# Patient Record
Sex: FEMALE | Race: WHITE | NOT HISPANIC OR LATINO | Employment: FULL TIME | URBAN - METROPOLITAN AREA
[De-identification: names, ages, dates, MRNs, and addresses within clinical notes are randomized per-mention and may not be internally consistent; named-entity substitution may affect disease eponyms.]

---

## 2018-05-31 ENCOUNTER — HOSPITAL ENCOUNTER (OUTPATIENT)
Dept: CARDIOLOGY | Facility: HOSPITAL | Age: 38
Discharge: HOME | End: 2018-05-31
Attending: NURSE PRACTITIONER | Admitting: NURSE PRACTITIONER

## 2019-06-03 ENCOUNTER — OFFICE VISIT (OUTPATIENT)
Dept: CARDIOLOGY | Facility: CLINIC | Age: 39
End: 2019-06-03

## 2019-06-03 VITALS
HEART RATE: 75 BPM | HEIGHT: 61 IN | DIASTOLIC BLOOD PRESSURE: 86 MMHG | SYSTOLIC BLOOD PRESSURE: 132 MMHG | WEIGHT: 137 LBS | BODY MASS INDEX: 25.86 KG/M2

## 2019-06-03 DIAGNOSIS — R00.2 PALPITATIONS: Primary | ICD-10-CM

## 2019-06-03 PROCEDURE — 93000 ELECTROCARDIOGRAM COMPLETE: CPT | Performed by: INTERNAL MEDICINE

## 2019-06-03 PROCEDURE — 99203 OFFICE O/P NEW LOW 30 MIN: CPT | Performed by: INTERNAL MEDICINE

## 2019-06-03 RX ORDER — ESCITALOPRAM OXALATE 10 MG/1
20 TABLET ORAL DAILY
COMMUNITY
Start: 2019-05-28 | End: 2022-08-09 | Stop reason: ALTCHOICE

## 2019-06-03 RX ORDER — HYDROXYZINE PAMOATE 25 MG/1
CAPSULE ORAL AS NEEDED
COMMUNITY
Start: 2019-05-28 | End: 2019-09-23

## 2019-06-03 NOTE — PROGRESS NOTES
Subjective:     Encounter Date:06/03/2019      Patient ID: Lexi Carr is a 38 y.o. female.    Chief Complaint:  This is a 38-year-old woman who presents for evaluation of tachycardia.  Over the last week she has had daily symptoms of tachycardia, diaphoresis, dyspnea, perioral/hand/feet paresthesias.  They occur at random.  They last between seconds and hours at a time.  Last week she presented to [facility] for evaluation of this.  By the time she arrived most of her symptoms have improved.  They diagnosed her with a panic attack/anxiety and she was started on Lexapro.  She is only been on Lexapro for about a week and has not noticed any change in her symptoms.  She is confused because she thought panic attack should not occur daily.  She exercises at the gym and has no exertional symptoms.  Her symptoms can occur both at rest and with moving around.  She has a family history of coronary disease/her father had bypass surgery.  She is a current pack per day smoker.  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  She drinks anywhere between 2 and 6 beers at a time on the weekends.          The following portions of the patient's history were reviewed and updated as appropriate: allergies, current medications, past family history, past medical history, past social history, past surgical history and problem list.    Past Medical History:   Diagnosis Date   • Chest discomfort    • Shortness of breath    • Weakness        History reviewed. No pertinent family history.    Social History     Socioeconomic History   • Marital status:      Spouse name: Not on file   • Number of children: Not on file   • Years of education: Not on file   • Highest education level: Not on file   Tobacco Use   • Smoking status: Current Every Day Smoker   • Smokeless tobacco: Never Used   • Tobacco comment: daily caffeine use   Substance and Sexual Activity   • Alcohol use: Yes     Frequency: Never      Comment: social   • Drug use: No       Allergies   Allergen Reactions   • Morphine Unknown (See Comments)     UNKNOWN       History reviewed. No pertinent surgical history.    Review of Systems   Constitution: Positive for diaphoresis and malaise/fatigue.   Cardiovascular: Positive for chest pain, dyspnea on exertion, irregular heartbeat, near-syncope and palpitations.   Respiratory: Positive for shortness of breath.          ECG 12 Lead  Date/Time: 6/3/2019 4:16 PM  Performed by: Zina Milner MD  Authorized by: Zina Milner MD   Previous ECG: no previous ECG available  Rhythm: sinus rhythm  Rate: normal  Conduction: conduction normal  ST Segments: ST segments normal  T Waves: T waves normal  QRS axis: normal  Other: no other findings    Clinical impression: normal ECG               Objective:     Physical Exam  GEN: no distress, alert and oriented, diaphoretic  Eyes: normal sclera, normal lids and lashes  HENT: moist mucus membranes,   Lungs CTAB, no rales or wheezes  Chest: no abnormalities  Neck: no JVD or carotid bruits  CV: RRR, no murmurs, , +2 DP and 2+ carotid pulses b/l  Abdo: soft,  Nontender, nondistended  Extr: no edema  Skin: no rash, warm, dry. Tan, sunburn,   Heme/Lymph: no bruising  Psych: organized thought, normal behavior and affect    Lab Review:         Assessment:          Diagnosis Plan   1. Palpitations  Holter Monitor - 48 Hour          Plan:         This is a 38-year-old woman who presents for evaluation of palpitations.  It does sound to me like she is having panic attacks.  She has palpitations which in turn Mendoza dyspnea, diaphoresis, perioral paresthesias, numbness and tingling in her hands and feet.  She does not have an overwhelming sense of doom, but she feels very unsettled.  I agree with the trial of Lexapro and explained the to the patient that it often takes weeks if not a full month before such medications will work.  I do think it is important to document what her  palpitations are considering the severity of her symptoms.  These events are happening every day so we will have her wear a 48-hour Holter monitor.  She had an echocardiogram in 2018 at [facility] which was apparently normal.  She is also had basic labs including thyroid studies last week which were also normal.    She needs to quit smoking.    Palma, thank you very much for referring this kind patient to me.  Please call with any questions or concerns.       Zina Milner MD  06/03/19

## 2019-09-06 ENCOUNTER — TELEPHONE (OUTPATIENT)
Dept: NEUROSURGERY | Facility: CLINIC | Age: 39
End: 2019-09-06

## 2019-09-11 NOTE — PROGRESS NOTES
"Subjective   History of Present Illness: Lexi Carr is a 39 y.o. female is being seen for consultation today at the request of [place] MD for 3 month history of R leg pain.  This began while she was on vacation back in June started out slowly and proximally and then extended throughout the whole right leg.  She has a weak sensation in the leg but she is not sure whether she simply guarding the leg trying to avoid moving it.  And not sit due to severe pain in the leg.  She denies any cause or injury.  She seen a Chiropractor who offered traction exercises and manipulation with no relief. She tried ice with no relief .  takes Advil 400 mg PRN for pain.  Denies bowel or bladder symptoms and denies numbness    Leg Pain    There was no injury mechanism. The pain is present in the right leg. The quality of the pain is described as shooting and stabbing. The pain is at a severity of 6/10. The pain is moderate. The pain has been constant since onset. Pertinent negatives include no numbness. Exacerbated by: sitting        The following portions of the patient's history were reviewed and updated as appropriate: allergies, current medications, past family history, past medical history, past social history, past surgical history and problem list.    Review of Systems   HENT: Negative.    Eyes: Negative.    Respiratory: Negative.    Cardiovascular: Negative.    Gastrointestinal: Negative.    Endocrine: Negative.    Genitourinary: Negative.    Musculoskeletal: Negative.         R leg pain   Skin: Negative.    Allergic/Immunologic: Negative.    Neurological: Positive for weakness (R leg ). Negative for numbness.   Hematological: Negative.    Psychiatric/Behavioral: Negative.    All other systems reviewed and are negative.      Objective     Vitals:    09/12/19 1003   BP: 137/80   Pulse: 64   Weight: 65.1 kg (143 lb 9.6 oz)   Height: 154.9 cm (61\")     Body mass index is 27.13 kg/m².      Physical " Exam  Neurologic Exam     Physical Exam:    CONSTITUTIONAL: This 39 year old right handed  female appears well developed, well-nourished and she is clearly uncomfortable moving about the room to try to find a comfortable standing position and she avoids sitting at all costs because of the severe right leg pain that develops when she sits.    HEAD & FACE: the head and face are symmetric, normocephalic and atraumatic.    EYES: Inspection of the conjunctivae and lids reveals no swelling, erythema or discharge.  Pupils are round, equal and reactive to light and there is no scleral icterus.    EARS, NOSE, MOUTH & THROAT: On inspection, the ears and nose are within normal limits.    NECK: the neck is supple and symmetric. The trachea is midline with no masses.    PULMONARY: Respiratory effort is normal with no increased work of breathing or signs of respiratory distress.    CARDIOVASCULAR: Pedal pulses are +2/4 bilaterally. Examination of the extremities shows no edema or varicosities.    LYMPHATIC: There is no palpable lymphadenopathy of the neck.    MUSCULOSKELETAL: Gait and station are within normal limits. The spine has normal alignment and range of motion,    SKIN: The skin is warm, dry and intact    NEUROLOGIC:   Cranial Nerves 2-12 intact  Normal motor strength noted. Muscle bulk and tone are normal.  Sensory exam is normal to all modalities.  Reflexes on the right side demonstrates 2/4 Triceps Reflex, 2/4 Biceps Reflex, 2/4 Brachioradialis Reflex, 2/4 Knee Jerk Reflex, 2/4 Ankle Jerk Reflex and no ankle clonus on the right.   Reflexes on the left side demonstrates 2/4 Triceps Reflex, 2/4 Biceps Reflex, 2/4 Brachioradialis Reflex, 2/4 Knee Jerk Reflex, 2/4 Ankle Jerk Reflex and no ankle clonus on the left.  Superficial/Primitive Reflexes: primitive reflexes were absent.  Ricky's Babinski and clonus are all negative  No coordination deficit observed.  Radicular testing showed a negative Waldemar (SAMRA)  test and positive straight leg raise on the right and cross positive on the left.  Cortical function is intact and without deficits. Speech is normal.    PSYCHIATRIC: oriented to person, place and time. Patient's mood and affect are normal.      Assessment/Plan   Independent Review of Radiographic Studies:      I personally reviewed the images from the following studies.    MRI of the lumbar spine done at [facility] MRI on August 9, 2019 reveals a right were disc extrusion L5-S1 contacting the right S1 nerve root.  There is degenerative change seen at L4-5 with some mild lateral recess narrowing no central stenosis.    Medical Decision Making:      She has a classic radiculopathy involving S1 on the right, although she has spared her right ankle reflex and sensation.  The intractable pain she can even sit in the exam room due to severe right leg pain over 3 months duration and failure of the therapeutic efforts offered by chiropractor she is now a candidate for surgical decompression.  I recommended a right L5-S1 lumbar microscopic discectomy.    A lumbar microscopic discectomy involves a small skin incision localized over the affected disc which is confirmed by X-ray. The natural space between lamina bones is widened with a drill and the nerve is gently retracted to expose the disc abnormality. The fragments of disc or bone spurs are removed to make more room around the nerve then the skin is closed with sutures    The patient understands that there are inherent risks to surgery including bleeding, infection, anesthetic risk, death, heart attack, stroke, damage to nerves, weakness, numbness, paralysis, failure to improve, need for further surgery, CSF leak, recurrence, persistent pain, and any other unforeseen complications. They comprehend and had opportunity to ask further questions.    Lexi was seen today for leg pain.    Diagnoses and all orders for this visit:    Herniated intervertebral disc of lumbar  spine  -     Case Request; Standing  -     ceFAZolin (ANCEF) 2 g in sodium chloride 0.9 % 100 mL IVPB  -     Case Request    Sciatica of right side  -     Case Request; Standing  -     ceFAZolin (ANCEF) 2 g in sodium chloride 0.9 % 100 mL IVPB  -     Case Request    Other orders  -     Follow anesthesia standing orders.  -     Obtain informed consent  -     Provide NPO Instructions to Patient; Future  -     Follow anesthesia standing orders.; Standing  -     Verify NPO Status; Standing  -     Obtain Informed Consent; Standing  -     SCD (sequential compression device)- to be placed on patient in Pre-op; Standing  -     Verify/Perform Chlorhexidine Skin Prep; Standing  -     Instructions on Coughing, Deep Breathing & Incentive Spirometry; Standing  -     Initiate Observation Status; Standing      Return for follow-up after surgery.           Omari Mims MD FACS FAANS  Neurological Surgery

## 2019-09-12 ENCOUNTER — OFFICE VISIT (OUTPATIENT)
Dept: NEUROSURGERY | Facility: CLINIC | Age: 39
End: 2019-09-12

## 2019-09-12 VITALS
HEART RATE: 64 BPM | DIASTOLIC BLOOD PRESSURE: 80 MMHG | SYSTOLIC BLOOD PRESSURE: 137 MMHG | BODY MASS INDEX: 27.11 KG/M2 | HEIGHT: 61 IN | WEIGHT: 143.6 LBS

## 2019-09-12 DIAGNOSIS — M51.26 HERNIATED INTERVERTEBRAL DISC OF LUMBAR SPINE: Primary | ICD-10-CM

## 2019-09-12 DIAGNOSIS — M54.31 SCIATICA OF RIGHT SIDE: ICD-10-CM

## 2019-09-12 PROBLEM — M54.30 SCIATICA: Status: ACTIVE | Noted: 2019-09-12

## 2019-09-12 PROCEDURE — 99244 OFF/OP CNSLTJ NEW/EST MOD 40: CPT | Performed by: NEUROLOGICAL SURGERY

## 2019-09-12 RX ORDER — TRAMADOL HYDROCHLORIDE 50 MG/1
TABLET ORAL
Refills: 0 | COMMUNITY
Start: 2019-08-21 | End: 2019-09-23

## 2019-09-12 RX ORDER — CEFAZOLIN SODIUM 2 G/100ML
2 INJECTION, SOLUTION INTRAVENOUS ONCE
Status: CANCELLED | OUTPATIENT
Start: 2019-09-30 | End: 2019-09-12

## 2019-09-23 ENCOUNTER — APPOINTMENT (OUTPATIENT)
Dept: PREADMISSION TESTING | Facility: HOSPITAL | Age: 39
End: 2019-09-23

## 2019-09-23 LAB
ANION GAP SERPL CALCULATED.3IONS-SCNC: 12 MMOL/L (ref 5–15)
BUN BLD-MCNC: 7 MG/DL (ref 6–20)
BUN/CREAT SERPL: 9.7 (ref 7–25)
CALCIUM SPEC-SCNC: 8.8 MG/DL (ref 8.6–10.5)
CHLORIDE SERPL-SCNC: 102 MMOL/L (ref 98–107)
CO2 SERPL-SCNC: 24 MMOL/L (ref 22–29)
CREAT BLD-MCNC: 0.72 MG/DL (ref 0.57–1)
DEPRECATED RDW RBC AUTO: 43.3 FL (ref 37–54)
ERYTHROCYTE [DISTWIDTH] IN BLOOD BY AUTOMATED COUNT: 12.6 % (ref 12.3–15.4)
GFR SERPL CREATININE-BSD FRML MDRD: 91 ML/MIN/1.73
GLUCOSE BLD-MCNC: 99 MG/DL (ref 65–99)
HCT VFR BLD AUTO: 40.2 % (ref 34–46.6)
HGB BLD-MCNC: 13.5 G/DL (ref 12–15.9)
MCH RBC QN AUTO: 31.6 PG (ref 26.6–33)
MCHC RBC AUTO-ENTMCNC: 33.6 G/DL (ref 31.5–35.7)
MCV RBC AUTO: 94.1 FL (ref 79–97)
PLATELET # BLD AUTO: 337 10*3/MM3 (ref 140–450)
PMV BLD AUTO: 10.3 FL (ref 6–12)
POTASSIUM BLD-SCNC: 4 MMOL/L (ref 3.5–5.2)
RBC # BLD AUTO: 4.27 10*6/MM3 (ref 3.77–5.28)
SODIUM BLD-SCNC: 138 MMOL/L (ref 136–145)
WBC NRBC COR # BLD: 9.76 10*3/MM3 (ref 3.4–10.8)

## 2019-09-23 PROCEDURE — 85027 COMPLETE CBC AUTOMATED: CPT | Performed by: NEUROLOGICAL SURGERY

## 2019-09-23 PROCEDURE — 36415 COLL VENOUS BLD VENIPUNCTURE: CPT

## 2019-09-23 PROCEDURE — 80048 BASIC METABOLIC PNL TOTAL CA: CPT | Performed by: NEUROLOGICAL SURGERY

## 2019-09-23 NOTE — DISCHARGE INSTRUCTIONS
Take the following medications the morning of surgery with a small sip of water:  NONE    PLEASE ARRIVE AT THE HOSPITAL AT 5:30 AM ON September 30, 2019    General Instructions:  • Do not eat solid food after midnight the night before surgery.  • You may drink clear liquids day of surgery but must stop at least one hour before your hospital arrival time.  • It is beneficial for you to have a clear drink that contains carbohydrates the day of surgery.  We suggest a 12 to 20 ounce bottle of Gatorade or Powerade for non-diabetic patients or a 12 to 20 ounce bottle of G2 or Powerade Zero for diabetic patients. (Pediatric patients, are not advised to drink a 12 to 20 ounce carbohydrate drink)    Clear liquids are liquids you can see through.  Nothing red in color.     Plain water                               Sports drinks  Sodas                                   Gelatin (Jell-O)  Fruit juices without pulp such as white grape juice and apple juice  Popsicles that contain no fruit or yogurt  Tea or coffee (no cream or milk added)  Gatorade / Powerade  G2 / Powerade Zero    • Infants may have breast milk up to four hours before surgery.  • Infants drinking formula may drink formula up to six hours before surgery.   • Patients who avoid smoking, chewing tobacco and alcohol for 4 weeks prior to surgery have a reduced risk of post-operative complications.  Quit smoking as many days before surgery as you can.  • Do not smoke, use chewing tobacco or drink alcohol the day of surgery.   • If applicable bring your C-PAP/ BI-PAP machine.  • Bring any papers given to you in the doctor’s office.  • Wear clean comfortable clothes and socks.  • Do not wear contact lenses, false eyelashes or make-up.  Bring a case for your glasses.   • Bring crutches or walker if applicable.  • Remove all piercings.  Leave jewelry and any other valuables at home.  • Hair extensions with metal clips must be removed prior to surgery.  • The Pre-Admission  Testing nurse will instruct you to bring medications if unable to obtain an accurate list in Pre-Admission Testing.      Preventing a Surgical Site Infection:  • For 2 to 3 days before surgery, avoid shaving with a razor because the razor can irritate skin and make it easier to develop an infection.    • Any areas of open skin can increase the risk of a post-operative wound infection by allowing bacteria to enter and travel throughout the body.  Notify your surgeon if you have any skin wounds / rashes even if it is not near the expected surgical site.  The area will need assessed to determine if surgery should be delayed until it is healed.  • The night prior to surgery sleep in a clean bed with clean clothing.  Do not allow pets to sleep with you.  • Shower on the morning of surgery using a fresh bar of anti-bacterial soap (such as Dial) and clean washcloth.  Dry with a clean towel and dress in clean clothing.  • Ask your surgeon if you will be receiving antibiotics prior to surgery.  • Make sure you, your family, and all healthcare providers clean their hands with soap and water or an alcohol based hand  before caring for you or your wound.    Day of surgery:  Upon arrival, a Pre-op nurse and Anesthesiologist will review your health history, obtain vital signs, and answer questions you may have.  The only belongings needed at this time will be a list of your home medications and if applicable your C-PAP/BI-PAP machine.  If you are staying overnight your family can leave the rest of your belongings in the car and bring them to your room later.  A Pre-op nurse will start an IV and you may receive medication in preparation for surgery, including something to help you relax.  Your family will be able to see you in the Pre-op area.  While you are in surgery your family should notify the waiting room  if they leave the waiting room area and provide a contact phone number.    Please be aware that  surgery does come with discomfort.  We want to make every effort to control your discomfort so please discuss any uncontrolled symptoms with your nurse.   Your doctor will most likely have prescribed pain medications.      If you are going home after surgery you will receive individualized written care instructions before being discharged.  A responsible adult must drive you to and from the hospital on the day of your surgery and stay with you for 24 hours.    If you are staying overnight following surgery, you will be transported to your hospital room following the recovery period.  [facility] has all private rooms.    You have received a list of surgical assistants for your reference.  If you have any questions please call Pre-Admission Testing at [phone].  Deductibles and co-payments are collected on the day of service. Please be prepared to pay the required co-pay, deductible or deposit on the day of service as defined by your plan.

## 2019-09-30 ENCOUNTER — HOSPITAL ENCOUNTER (OUTPATIENT)
Facility: HOSPITAL | Age: 39
Discharge: HOME | End: 2019-09-30
Attending: NEUROLOGICAL SURGERY | Admitting: NEUROLOGICAL SURGERY

## 2019-09-30 ENCOUNTER — ANESTHESIA EVENT (OUTPATIENT)
Dept: PERIOP | Facility: HOSPITAL | Age: 39
End: 2019-09-30

## 2019-09-30 ENCOUNTER — APPOINTMENT (OUTPATIENT)
Dept: GENERAL RADIOLOGY | Facility: HOSPITAL | Age: 39
End: 2019-09-30

## 2019-09-30 ENCOUNTER — ANESTHESIA (OUTPATIENT)
Dept: PERIOP | Facility: HOSPITAL | Age: 39
End: 2019-09-30

## 2019-09-30 VITALS
WEIGHT: 139.31 LBS | HEIGHT: 62 IN | HEART RATE: 70 BPM | SYSTOLIC BLOOD PRESSURE: 124 MMHG | BODY MASS INDEX: 25.64 KG/M2 | OXYGEN SATURATION: 97 % | RESPIRATION RATE: 16 BRPM | DIASTOLIC BLOOD PRESSURE: 88 MMHG | TEMPERATURE: 98.3 F

## 2019-09-30 DIAGNOSIS — M54.31 SCIATICA OF RIGHT SIDE: ICD-10-CM

## 2019-09-30 DIAGNOSIS — M51.26 HERNIATED INTERVERTEBRAL DISC OF LUMBAR SPINE: ICD-10-CM

## 2019-09-30 LAB
B-HCG UR QL: NEGATIVE
INTERNAL NEGATIVE CONTROL: NEGATIVE
INTERNAL POSITIVE CONTROL: POSITIVE
Lab: NORMAL

## 2019-09-30 PROCEDURE — 25010000002 DEXAMETHASONE PER 1 MG: Performed by: NURSE ANESTHETIST, CERTIFIED REGISTERED

## 2019-09-30 PROCEDURE — 72020 X-RAY EXAM OF SPINE 1 VIEW: CPT

## 2019-09-30 PROCEDURE — 81025 URINE PREGNANCY TEST: CPT | Performed by: ANESTHESIOLOGY

## 2019-09-30 PROCEDURE — 25010000002 KETOROLAC TROMETHAMINE PER 15 MG: Performed by: NURSE ANESTHETIST, CERTIFIED REGISTERED

## 2019-09-30 PROCEDURE — 25010000002 FENTANYL CITRATE (PF) 100 MCG/2ML SOLUTION

## 2019-09-30 PROCEDURE — 25010000002 NEOSTIGMINE PER 0.5 MG: Performed by: NURSE ANESTHETIST, CERTIFIED REGISTERED

## 2019-09-30 PROCEDURE — 25010000002 FENTANYL CITRATE (PF) 100 MCG/2ML SOLUTION: Performed by: NURSE ANESTHETIST, CERTIFIED REGISTERED

## 2019-09-30 PROCEDURE — 63030 LAMOT DCMPRN NRV RT 1 LMBR: CPT | Performed by: NEUROLOGICAL SURGERY

## 2019-09-30 PROCEDURE — 25010000003 CEFAZOLIN IN DEXTROSE 2-4 GM/100ML-% SOLUTION: Performed by: NEUROLOGICAL SURGERY

## 2019-09-30 PROCEDURE — 25010000002 PROPOFOL 10 MG/ML EMULSION: Performed by: NURSE ANESTHETIST, CERTIFIED REGISTERED

## 2019-09-30 PROCEDURE — 25010000002 FENTANYL CITRATE (PF) 100 MCG/2ML SOLUTION: Performed by: ANESTHESIOLOGY

## 2019-09-30 PROCEDURE — 25010000002 ONDANSETRON PER 1 MG: Performed by: NURSE ANESTHETIST, CERTIFIED REGISTERED

## 2019-09-30 PROCEDURE — G0378 HOSPITAL OBSERVATION PER HR: HCPCS

## 2019-09-30 PROCEDURE — 25010000002 MIDAZOLAM PER 1 MG: Performed by: ANESTHESIOLOGY

## 2019-09-30 PROCEDURE — 63030 LAMOT DCMPRN NRV RT 1 LMBR: CPT | Performed by: SPECIALIST/TECHNOLOGIST, OTHER

## 2019-09-30 RX ORDER — KETOROLAC TROMETHAMINE 30 MG/ML
15 INJECTION, SOLUTION INTRAMUSCULAR; INTRAVENOUS EVERY 6 HOURS PRN
Status: CANCELLED | OUTPATIENT
Start: 2019-09-30 | End: 2019-10-01

## 2019-09-30 RX ORDER — NALOXONE HCL 0.4 MG/ML
0.2 VIAL (ML) INJECTION AS NEEDED
Status: DISCONTINUED | OUTPATIENT
Start: 2019-09-30 | End: 2019-09-30 | Stop reason: HOSPADM

## 2019-09-30 RX ORDER — CYCLOBENZAPRINE HCL 10 MG
10 TABLET ORAL 3 TIMES DAILY PRN
Qty: 60 TABLET | Refills: 3 | Status: SHIPPED | OUTPATIENT
Start: 2019-09-30 | End: 2019-11-05

## 2019-09-30 RX ORDER — SODIUM CHLORIDE 0.9 % (FLUSH) 0.9 %
10 SYRINGE (ML) INJECTION AS NEEDED
Status: CANCELLED | OUTPATIENT
Start: 2019-09-30

## 2019-09-30 RX ORDER — LABETALOL HYDROCHLORIDE 5 MG/ML
5 INJECTION, SOLUTION INTRAVENOUS
Status: DISCONTINUED | OUTPATIENT
Start: 2019-09-30 | End: 2019-09-30 | Stop reason: HOSPADM

## 2019-09-30 RX ORDER — ACETAMINOPHEN 325 MG/1
650 TABLET ORAL ONCE AS NEEDED
Status: DISCONTINUED | OUTPATIENT
Start: 2019-09-30 | End: 2019-09-30 | Stop reason: HOSPADM

## 2019-09-30 RX ORDER — SODIUM CHLORIDE 0.9 % (FLUSH) 0.9 %
3-10 SYRINGE (ML) INJECTION AS NEEDED
Status: DISCONTINUED | OUTPATIENT
Start: 2019-09-30 | End: 2019-09-30 | Stop reason: HOSPADM

## 2019-09-30 RX ORDER — PROMETHAZINE HYDROCHLORIDE 25 MG/1
25 TABLET ORAL ONCE AS NEEDED
Status: DISCONTINUED | OUTPATIENT
Start: 2019-09-30 | End: 2019-09-30 | Stop reason: HOSPADM

## 2019-09-30 RX ORDER — DIPHENHYDRAMINE HYDROCHLORIDE 50 MG/ML
12.5 INJECTION INTRAMUSCULAR; INTRAVENOUS
Status: DISCONTINUED | OUTPATIENT
Start: 2019-09-30 | End: 2019-09-30 | Stop reason: HOSPADM

## 2019-09-30 RX ORDER — PROPOFOL 10 MG/ML
VIAL (ML) INTRAVENOUS AS NEEDED
Status: DISCONTINUED | OUTPATIENT
Start: 2019-09-30 | End: 2019-09-30 | Stop reason: SURG

## 2019-09-30 RX ORDER — FENTANYL CITRATE 50 UG/ML
INJECTION, SOLUTION INTRAMUSCULAR; INTRAVENOUS
Status: COMPLETED
Start: 2019-09-30 | End: 2019-09-30

## 2019-09-30 RX ORDER — MIDAZOLAM HYDROCHLORIDE 1 MG/ML
2 INJECTION INTRAMUSCULAR; INTRAVENOUS
Status: DISCONTINUED | OUTPATIENT
Start: 2019-09-30 | End: 2019-09-30 | Stop reason: HOSPADM

## 2019-09-30 RX ORDER — ONDANSETRON 2 MG/ML
4 INJECTION INTRAMUSCULAR; INTRAVENOUS EVERY 6 HOURS PRN
Status: CANCELLED | OUTPATIENT
Start: 2019-09-30

## 2019-09-30 RX ORDER — ONDANSETRON 2 MG/ML
4 INJECTION INTRAMUSCULAR; INTRAVENOUS ONCE AS NEEDED
Status: DISCONTINUED | OUTPATIENT
Start: 2019-09-30 | End: 2019-09-30 | Stop reason: HOSPADM

## 2019-09-30 RX ORDER — HYDRALAZINE HYDROCHLORIDE 20 MG/ML
5 INJECTION INTRAMUSCULAR; INTRAVENOUS
Status: DISCONTINUED | OUTPATIENT
Start: 2019-09-30 | End: 2019-09-30 | Stop reason: HOSPADM

## 2019-09-30 RX ORDER — SCOLOPAMINE TRANSDERMAL SYSTEM 1 MG/1
1 PATCH, EXTENDED RELEASE TRANSDERMAL ONCE
Status: DISCONTINUED | OUTPATIENT
Start: 2019-09-30 | End: 2019-09-30 | Stop reason: HOSPADM

## 2019-09-30 RX ORDER — ONDANSETRON 4 MG/1
4 TABLET, FILM COATED ORAL EVERY 6 HOURS PRN
Status: CANCELLED | OUTPATIENT
Start: 2019-09-30

## 2019-09-30 RX ORDER — PROMETHAZINE HYDROCHLORIDE 25 MG/ML
6.25 INJECTION, SOLUTION INTRAMUSCULAR; INTRAVENOUS
Status: DISCONTINUED | OUTPATIENT
Start: 2019-09-30 | End: 2019-09-30 | Stop reason: HOSPADM

## 2019-09-30 RX ORDER — EPHEDRINE SULFATE 50 MG/ML
5 INJECTION, SOLUTION INTRAVENOUS ONCE AS NEEDED
Status: DISCONTINUED | OUTPATIENT
Start: 2019-09-30 | End: 2019-09-30 | Stop reason: HOSPADM

## 2019-09-30 RX ORDER — SODIUM CHLORIDE 0.9 % (FLUSH) 0.9 %
3 SYRINGE (ML) INJECTION EVERY 12 HOURS SCHEDULED
Status: DISCONTINUED | OUTPATIENT
Start: 2019-09-30 | End: 2019-09-30 | Stop reason: HOSPADM

## 2019-09-30 RX ORDER — PROMETHAZINE HYDROCHLORIDE 25 MG/1
25 SUPPOSITORY RECTAL ONCE AS NEEDED
Status: DISCONTINUED | OUTPATIENT
Start: 2019-09-30 | End: 2019-09-30 | Stop reason: HOSPADM

## 2019-09-30 RX ORDER — LIDOCAINE HYDROCHLORIDE 40 MG/ML
SOLUTION TOPICAL AS NEEDED
Status: DISCONTINUED | OUTPATIENT
Start: 2019-09-30 | End: 2019-09-30 | Stop reason: SURG

## 2019-09-30 RX ORDER — FENTANYL CITRATE 50 UG/ML
50 INJECTION, SOLUTION INTRAMUSCULAR; INTRAVENOUS
Status: DISCONTINUED | OUTPATIENT
Start: 2019-09-30 | End: 2019-09-30 | Stop reason: HOSPADM

## 2019-09-30 RX ORDER — BUPIVACAINE HYDROCHLORIDE AND EPINEPHRINE 5; 5 MG/ML; UG/ML
INJECTION, SOLUTION PERINEURAL AS NEEDED
Status: DISCONTINUED | OUTPATIENT
Start: 2019-09-30 | End: 2019-09-30 | Stop reason: HOSPADM

## 2019-09-30 RX ORDER — CYCLOBENZAPRINE HCL 10 MG
10 TABLET ORAL 3 TIMES DAILY PRN
Status: CANCELLED | OUTPATIENT
Start: 2019-09-30

## 2019-09-30 RX ORDER — MIDAZOLAM HYDROCHLORIDE 1 MG/ML
1 INJECTION INTRAMUSCULAR; INTRAVENOUS
Status: DISCONTINUED | OUTPATIENT
Start: 2019-09-30 | End: 2019-09-30 | Stop reason: HOSPADM

## 2019-09-30 RX ORDER — HYDROCODONE BITARTRATE AND ACETAMINOPHEN 7.5; 325 MG/1; MG/1
1 TABLET ORAL ONCE AS NEEDED
Status: COMPLETED | OUTPATIENT
Start: 2019-09-30 | End: 2019-09-30

## 2019-09-30 RX ORDER — CEFAZOLIN SODIUM 2 G/100ML
2 INJECTION, SOLUTION INTRAVENOUS ONCE
Status: COMPLETED | OUTPATIENT
Start: 2019-09-30 | End: 2019-09-30

## 2019-09-30 RX ORDER — ROCURONIUM BROMIDE 10 MG/ML
INJECTION, SOLUTION INTRAVENOUS AS NEEDED
Status: DISCONTINUED | OUTPATIENT
Start: 2019-09-30 | End: 2019-09-30 | Stop reason: SURG

## 2019-09-30 RX ORDER — ACETAMINOPHEN 325 MG/1
650 TABLET ORAL EVERY 4 HOURS PRN
Status: CANCELLED | OUTPATIENT
Start: 2019-09-30

## 2019-09-30 RX ORDER — HYDROCODONE BITARTRATE AND ACETAMINOPHEN 7.5; 325 MG/1; MG/1
1 TABLET ORAL EVERY 6 HOURS PRN
Qty: 40 TABLET | Refills: 0 | Status: SHIPPED | OUTPATIENT
Start: 2019-09-30 | End: 2019-10-14 | Stop reason: HOSPADM

## 2019-09-30 RX ORDER — FAMOTIDINE 10 MG/ML
20 INJECTION, SOLUTION INTRAVENOUS ONCE
Status: COMPLETED | OUTPATIENT
Start: 2019-09-30 | End: 2019-09-30

## 2019-09-30 RX ORDER — GLYCOPYRROLATE 0.2 MG/ML
INJECTION INTRAMUSCULAR; INTRAVENOUS AS NEEDED
Status: DISCONTINUED | OUTPATIENT
Start: 2019-09-30 | End: 2019-09-30 | Stop reason: SURG

## 2019-09-30 RX ORDER — SENNA AND DOCUSATE SODIUM 50; 8.6 MG/1; MG/1
1 TABLET, FILM COATED ORAL NIGHTLY PRN
Status: CANCELLED | OUTPATIENT
Start: 2019-09-30

## 2019-09-30 RX ORDER — SODIUM CHLORIDE, SODIUM LACTATE, POTASSIUM CHLORIDE, CALCIUM CHLORIDE 600; 310; 30; 20 MG/100ML; MG/100ML; MG/100ML; MG/100ML
9 INJECTION, SOLUTION INTRAVENOUS CONTINUOUS
Status: DISCONTINUED | OUTPATIENT
Start: 2019-09-30 | End: 2019-09-30 | Stop reason: HOSPADM

## 2019-09-30 RX ORDER — OXYCODONE AND ACETAMINOPHEN 7.5; 325 MG/1; MG/1
1 TABLET ORAL ONCE AS NEEDED
Status: DISCONTINUED | OUTPATIENT
Start: 2019-09-30 | End: 2019-09-30 | Stop reason: HOSPADM

## 2019-09-30 RX ORDER — SODIUM CHLORIDE 0.9 % (FLUSH) 0.9 %
3 SYRINGE (ML) INJECTION EVERY 12 HOURS SCHEDULED
Status: CANCELLED | OUTPATIENT
Start: 2019-09-30

## 2019-09-30 RX ORDER — EPHEDRINE SULFATE 50 MG/ML
INJECTION, SOLUTION INTRAVENOUS AS NEEDED
Status: DISCONTINUED | OUTPATIENT
Start: 2019-09-30 | End: 2019-09-30 | Stop reason: SURG

## 2019-09-30 RX ORDER — FLUMAZENIL 0.1 MG/ML
0.2 INJECTION INTRAVENOUS AS NEEDED
Status: DISCONTINUED | OUTPATIENT
Start: 2019-09-30 | End: 2019-09-30 | Stop reason: HOSPADM

## 2019-09-30 RX ORDER — SODIUM CHLORIDE AND POTASSIUM CHLORIDE 150; 900 MG/100ML; MG/100ML
75 INJECTION, SOLUTION INTRAVENOUS CONTINUOUS
Status: CANCELLED | OUTPATIENT
Start: 2019-09-30

## 2019-09-30 RX ORDER — ONDANSETRON 2 MG/ML
INJECTION INTRAMUSCULAR; INTRAVENOUS AS NEEDED
Status: DISCONTINUED | OUTPATIENT
Start: 2019-09-30 | End: 2019-09-30 | Stop reason: SURG

## 2019-09-30 RX ORDER — DEXAMETHASONE SODIUM PHOSPHATE 10 MG/ML
INJECTION INTRAMUSCULAR; INTRAVENOUS AS NEEDED
Status: DISCONTINUED | OUTPATIENT
Start: 2019-09-30 | End: 2019-09-30 | Stop reason: SURG

## 2019-09-30 RX ORDER — LIDOCAINE HYDROCHLORIDE 10 MG/ML
0.5 INJECTION, SOLUTION EPIDURAL; INFILTRATION; INTRACAUDAL; PERINEURAL ONCE AS NEEDED
Status: DISCONTINUED | OUTPATIENT
Start: 2019-09-30 | End: 2019-09-30 | Stop reason: HOSPADM

## 2019-09-30 RX ORDER — PROMETHAZINE HYDROCHLORIDE 25 MG/ML
12.5 INJECTION, SOLUTION INTRAMUSCULAR; INTRAVENOUS ONCE AS NEEDED
Status: DISCONTINUED | OUTPATIENT
Start: 2019-09-30 | End: 2019-09-30 | Stop reason: HOSPADM

## 2019-09-30 RX ORDER — DIPHENHYDRAMINE HCL 25 MG
25 CAPSULE ORAL
Status: DISCONTINUED | OUTPATIENT
Start: 2019-09-30 | End: 2019-09-30 | Stop reason: HOSPADM

## 2019-09-30 RX ORDER — LIDOCAINE HYDROCHLORIDE 20 MG/ML
INJECTION, SOLUTION INFILTRATION; PERINEURAL AS NEEDED
Status: DISCONTINUED | OUTPATIENT
Start: 2019-09-30 | End: 2019-09-30 | Stop reason: SURG

## 2019-09-30 RX ORDER — HYDROMORPHONE HYDROCHLORIDE 1 MG/ML
0.5 INJECTION, SOLUTION INTRAMUSCULAR; INTRAVENOUS; SUBCUTANEOUS
Status: DISCONTINUED | OUTPATIENT
Start: 2019-09-30 | End: 2019-09-30 | Stop reason: HOSPADM

## 2019-09-30 RX ORDER — ZOLPIDEM TARTRATE 5 MG/1
5 TABLET ORAL NIGHTLY PRN
Status: CANCELLED | OUTPATIENT
Start: 2019-09-30 | End: 2019-10-10

## 2019-09-30 RX ORDER — KETOROLAC TROMETHAMINE 30 MG/ML
INJECTION, SOLUTION INTRAMUSCULAR; INTRAVENOUS AS NEEDED
Status: DISCONTINUED | OUTPATIENT
Start: 2019-09-30 | End: 2019-09-30 | Stop reason: SURG

## 2019-09-30 RX ADMIN — GLYCOPYRROLATE 0.5 MG: 0.2 INJECTION INTRAMUSCULAR; INTRAVENOUS at 08:44

## 2019-09-30 RX ADMIN — SODIUM CHLORIDE, POTASSIUM CHLORIDE, SODIUM LACTATE AND CALCIUM CHLORIDE 9 ML/HR: 600; 310; 30; 20 INJECTION, SOLUTION INTRAVENOUS at 06:20

## 2019-09-30 RX ADMIN — FENTANYL CITRATE 50 MCG: 50 INJECTION INTRAMUSCULAR; INTRAVENOUS at 09:04

## 2019-09-30 RX ADMIN — ROCURONIUM BROMIDE 50 MG: 10 INJECTION INTRAVENOUS at 07:35

## 2019-09-30 RX ADMIN — DEXAMETHASONE SODIUM PHOSPHATE 8 MG: 10 INJECTION INTRAMUSCULAR; INTRAVENOUS at 07:46

## 2019-09-30 RX ADMIN — FENTANYL CITRATE 100 MCG: 50 INJECTION INTRAMUSCULAR; INTRAVENOUS at 07:35

## 2019-09-30 RX ADMIN — FENTANYL CITRATE 50 MCG: 50 INJECTION INTRAMUSCULAR; INTRAVENOUS at 08:55

## 2019-09-30 RX ADMIN — KETOROLAC TROMETHAMINE 30 MG: 30 INJECTION, SOLUTION INTRAMUSCULAR; INTRAVENOUS at 08:40

## 2019-09-30 RX ADMIN — FENTANYL CITRATE 50 MCG: 50 INJECTION INTRAMUSCULAR; INTRAVENOUS at 09:00

## 2019-09-30 RX ADMIN — FENTANYL CITRATE 50 MCG: 50 INJECTION INTRAMUSCULAR; INTRAVENOUS at 09:31

## 2019-09-30 RX ADMIN — FENTANYL CITRATE 50 MCG: 50 INJECTION INTRAMUSCULAR; INTRAVENOUS at 09:42

## 2019-09-30 RX ADMIN — SCOPALAMINE 1 PATCH: 1 PATCH, EXTENDED RELEASE TRANSDERMAL at 07:14

## 2019-09-30 RX ADMIN — EPHEDRINE SULFATE 5 MG: 50 INJECTION INTRAMUSCULAR; INTRAVENOUS; SUBCUTANEOUS at 08:23

## 2019-09-30 RX ADMIN — LIDOCAINE HYDROCHLORIDE 1 EACH: 40 SOLUTION TOPICAL at 07:38

## 2019-09-30 RX ADMIN — LIDOCAINE HYDROCHLORIDE 60 MG: 20 INJECTION, SOLUTION INFILTRATION; PERINEURAL at 07:35

## 2019-09-30 RX ADMIN — CEFAZOLIN SODIUM 2 G: 2 INJECTION, SOLUTION INTRAVENOUS at 07:44

## 2019-09-30 RX ADMIN — HYDROCODONE BITARTRATE AND ACETAMINOPHEN 1 TABLET: 7.5; 325 TABLET ORAL at 09:58

## 2019-09-30 RX ADMIN — ONDANSETRON 4 MG: 2 INJECTION INTRAMUSCULAR; INTRAVENOUS at 07:35

## 2019-09-30 RX ADMIN — PROPOFOL 150 MG: 10 INJECTION, EMULSION INTRAVENOUS at 07:35

## 2019-09-30 RX ADMIN — NEOSTIGMINE METHYLSULFATE 2.5 MG: 1 INJECTION INTRAMUSCULAR; INTRAVENOUS; SUBCUTANEOUS at 08:44

## 2019-09-30 RX ADMIN — MIDAZOLAM 2 MG: 1 INJECTION INTRAMUSCULAR; INTRAVENOUS at 07:15

## 2019-09-30 RX ADMIN — FAMOTIDINE 20 MG: 10 INJECTION, SOLUTION INTRAVENOUS at 07:16

## 2019-09-30 NOTE — ANESTHESIA PROCEDURE NOTES
Airway  Urgency: elective    Date/Time: 9/30/2019 7:38 AM  Airway not difficult    General Information and Staff    Patient location during procedure: OR  Anesthesiologist: Jani Mccollum MD  CRNA: Dyana Balderas CRNA    Indications and Patient Condition  Indications for airway management: airway protection    Preoxygenated: yes  Mask difficulty assessment: 1 - vent by mask    Final Airway Details  Final airway type: endotracheal airway      Successful airway: ETT  Cuffed: yes   Successful intubation technique: direct laryngoscopy  Facilitating devices/methods: intubating stylet  Endotracheal tube insertion site: oral  Blade: Sg  Blade size: 3  ETT size (mm): 7.0  Cormack-Lehane Classification: grade IIa - partial view of glottis  Placement verified by: chest auscultation   Cuff volume (mL): 8  Measured from: lips  ETT/EBT  to lips (cm): 20  Number of attempts at approach: 1  Assessment: lips, teeth, and gum same as pre-op and atraumatic intubation

## 2019-09-30 NOTE — ANESTHESIA POSTPROCEDURE EVALUATION
"Patient: Lexi Carr    Procedure Summary     Date:  09/30/19 Room / Location:  [facility] OR  / [facility] MAIN OR    Anesthesia Start:  0727 Anesthesia Stop:  0904    Procedure:  Lumbar Microscopic Discectomy, right Lumbar 5 Sacral 1 (Right Spine Lumbar) Diagnosis:       Herniated intervertebral disc of lumbar spine      Sciatica of right side      (Herniated intervertebral disc of lumbar spine [M51.26])      (Sciatica of right side [M54.31])    Surgeon:  Omari Mims MD Provider:  Jani Mccollum MD    Anesthesia Type:  general ASA Status:  2          Anesthesia Type: general  Last vitals  BP   125/82 (09/30/19 1030)   Temp   36.8 °C (98.3 °F) (09/30/19 1015)   Pulse   72 (09/30/19 1030)   Resp   16 (09/30/19 1030)     SpO2   96 % (09/30/19 1030)     Post Anesthesia Care and Evaluation    Patient location during evaluation: PHASE II  Patient participation: complete - patient participated  Level of consciousness: awake  Pain management: adequate  Airway patency: patent  Anesthetic complications: No anesthetic complications    Cardiovascular status: acceptable  Respiratory status: acceptable  Hydration status: acceptable    Comments: /82   Pulse 72   Temp 36.8 °C (98.3 °F) (Oral)   Resp 16   Ht 157.5 cm (62\")   Wt 63.2 kg (139 lb 5 oz)   LMP 09/14/2019   SpO2 96%   BMI 25.48 kg/m²         "

## 2019-10-07 ENCOUNTER — TELEPHONE (OUTPATIENT)
Dept: NEUROSURGERY | Facility: CLINIC | Age: 39
End: 2019-10-07

## 2019-10-07 RX ORDER — METHYLPREDNISOLONE 4 MG/1
TABLET ORAL
Qty: 1 EACH | Refills: 0 | Status: SHIPPED | OUTPATIENT
Start: 2019-10-07 | End: 2019-10-14

## 2019-10-07 NOTE — TELEPHONE ENCOUNTER
Pt called stating that she had surgery on 9/30 and her right leg has been hurting since last Thursday and doesn't know if this is something that is to be expected and should she be concerned? Pt states that the pain is throbbing and sharp and radiates into her right hip, thigh and down the back of her leg. Pt states that she does not have back pain, just leg pain and that she has not taken any pain medication since Wednesday. Please call and advise.

## 2019-10-07 NOTE — TELEPHONE ENCOUNTER
Called patient.  Mostly proximal pain.  No swelling, numbness or weakness.  I sent medrol to pharmacy on file.

## 2019-10-07 NOTE — TELEPHONE ENCOUNTER
I called and spoke with patient. She has pain medication but does not fill like her back pain is bad enough to take it. She has been taking Flexeril 10 mg at bed time but has noticed no relief. It just makes her go to seep. She denies any bladder or bowel incontinence and N/T. She denies any incisional problems and fever. She reports that all of the pain she is having is radiating down into her right lower extremity.       She is 1 week out from having a  Laminotomy (Hemilaminectomy), with decompression of nerve root, including partial facetectomy, foraminotomy and excision of herniated intervertebral disc, L5-S1 on the Right on 09/30/19.

## 2019-10-09 NOTE — PROGRESS NOTES
Subjective   Patient ID: Lexi Carr is a 39 y.o. female is here today for follow-up.  She is 2 weeks out from a right L5-S1 lumbar discectomy by Dr. Mims 9/30/19. She completed MDP which offered a little relief. She still complains of R leg pain. She denies any incision problems. Mrs. Carr takes Cyclobenzaprine 10 mg prn for pain.     Leg Pain    The pain is present in the right leg. The pain is at a severity of 3/10. The pain is mild.       The following portions of the patient's history were reviewed and updated as appropriate: allergies, current medications, past family history, past medical history, past social history, past surgical history and problem list.    Review of Systems   Genitourinary: Negative for difficulty urinating.   Musculoskeletal:        R leg pain    All other systems reviewed and are negative.      Objective   Physical Exam   Neurological:   Incision ok     Neurologic Exam    Assessment/Plan   Independent Review of Radiographic Studies:      Medical Decision Making:    Ms. Carr is 2 weeks out from a right L5-S1 lumbar discectomy.  She has done very well but still has quite a lot of discomfort and cramping type pain in the right buttock and posterior leg.  The pain is similar in severity to her preoperative pain but different in quality.  It is now more of an intermittent cramping rather than a constant burning type pain.  She did try a Medrol Dosepak without relief.  I reassured her that such symptoms are not uncommon and encouraged her to give it more time.  She will begin physical therapy.  We did discuss her restrictions.  She will call sooner with any questions or concerns and otherwise follow-up in 2 weeks.  If the pain has not improved at all we could consider new imaging.  Lexi was seen today for post-op.    Diagnoses and all orders for this visit:    Surgery follow-up examination  -     Ambulatory Referral to Physical Therapy Evaluate and treat (2-3 times per week  for 4wks)      Return in about 2 weeks (around 10/28/2019).

## 2019-10-14 ENCOUNTER — OFFICE VISIT (OUTPATIENT)
Dept: NEUROSURGERY | Facility: CLINIC | Age: 39
End: 2019-10-14

## 2019-10-14 VITALS
HEART RATE: 82 BPM | SYSTOLIC BLOOD PRESSURE: 127 MMHG | WEIGHT: 139 LBS | DIASTOLIC BLOOD PRESSURE: 86 MMHG | BODY MASS INDEX: 25.58 KG/M2 | HEIGHT: 62 IN

## 2019-10-14 DIAGNOSIS — Z09 SURGERY FOLLOW-UP EXAMINATION: Primary | ICD-10-CM

## 2019-10-14 PROCEDURE — 99024 POSTOP FOLLOW-UP VISIT: CPT | Performed by: PHYSICIAN ASSISTANT

## 2019-11-05 ENCOUNTER — OFFICE VISIT (OUTPATIENT)
Dept: NEUROSURGERY | Facility: CLINIC | Age: 39
End: 2019-11-05

## 2019-11-05 VITALS
BODY MASS INDEX: 26.52 KG/M2 | HEART RATE: 68 BPM | DIASTOLIC BLOOD PRESSURE: 64 MMHG | WEIGHT: 145 LBS | SYSTOLIC BLOOD PRESSURE: 120 MMHG

## 2019-11-05 DIAGNOSIS — Z09 SURGERY FOLLOW-UP EXAMINATION: Primary | ICD-10-CM

## 2019-11-05 PROBLEM — M54.31 SCIATICA OF RIGHT SIDE: Status: RESOLVED | Noted: 2019-09-12 | Resolved: 2019-11-05

## 2019-11-05 PROBLEM — M54.30 SCIATICA: Status: RESOLVED | Noted: 2019-09-12 | Resolved: 2019-11-05

## 2019-11-05 PROBLEM — M51.26 HERNIATED INTERVERTEBRAL DISC OF LUMBAR SPINE: Status: RESOLVED | Noted: 2019-09-12 | Resolved: 2019-11-05

## 2019-11-05 PROCEDURE — 99024 POSTOP FOLLOW-UP VISIT: CPT | Performed by: PHYSICIAN ASSISTANT

## 2019-12-05 NOTE — PROGRESS NOTES
"Subjective   History of Present Illness: Lexi Carr is a 39 y.o. female is here today for follow-up.  She's 10 weeks out from a of R L5-S1 discectomy by Dr Mims on 9/30/19. She is doing well.  She has returned to work full time and tolerating it well.  She is not taking any pain medications.     History of Present Illness    The following portions of the patient's history were reviewed and updated as appropriate: allergies, current medications, past family history, past medical history, past social history, past surgical history and problem list.    Review of Systems   Genitourinary: Negative for difficulty urinating.   Musculoskeletal: Positive for back pain.   All other systems reviewed and are negative.      Objective     Vitals:    12/13/19 1438   BP: 136/90   Pulse: 85   Weight: 65.8 kg (145 lb)   Height: 157.5 cm (62\")     Body mass index is 26.52 kg/m².      Physical Exam   Constitutional: She is oriented to person, place, and time. She appears well-developed and well-nourished. No distress.   HENT:   Head: Normocephalic and atraumatic.   Eyes: Pupils are equal, round, and reactive to light.   Neck: Normal range of motion.   Pulmonary/Chest: Effort normal.   Musculoskeletal: She exhibits no edema.   Neurological: She is alert and oriented to person, place, and time. She has normal strength. Gait normal.   Reflex Scores:       Tricep reflexes are 3+ on the right side and 3+ on the left side.       Bicep reflexes are 2+ on the right side and 2+ on the left side.       Brachioradialis reflexes are 2+ on the right side and 2+ on the left side.       Patellar reflexes are 3+ on the right side and 3+ on the left side.       Achilles reflexes are 2+ on the right side and 2+ on the left side.  Skin: Skin is warm and dry.   Psychiatric: She has a normal mood and affect. Her speech is normal.     Neurologic Exam     Mental Status   Oriented to person, place, and time.   Speech: speech is normal   Level of " consciousness: alert    Cranial Nerves     CN III, IV, VI   Pupils are equal, round, and reactive to light.    Motor Exam   Muscle bulk: normal  Overall muscle tone: normal    Strength   Strength 5/5 throughout.     Sensory Exam   Light touch normal.     Gait, Coordination, and Reflexes     Gait  Gait: normal    Reflexes   Right brachioradialis: 2+  Left brachioradialis: 2+  Right biceps: 2+  Left biceps: 2+  Right triceps: 3+  Left triceps: 3+  Right patellar: 3+  Left patellar: 3+  Right achilles: 2+  Left achilles: 2+  Right Shah: present  Left Shah: present  Right ankle clonus: absent  Left ankle clonus: absent        Assessment/Plan   Independent Review of Radiographic Studies:      I personally reviewed the images from the following studies.    No recent imaging    Medical Decision Making:    She is doing well from a surgical perspective and has already been back to work with no setbacks.  Interestingly, in conversation she states that she is imbalanced especially when she is trying to walk quickly on a treadmill.  Further discussion she also complains of incoordination of the hands with fine motor movements.  She says she has had these symptoms for several years and had never given them much of thought.  She does have quicker than average reflexes and bilateral Shah's.  She says she was told in the past that she had cervical disc disease, but she cannot recall the details.  Due to her complaints and exam findings cervical MRI is warranted.  We will have her get cervical MRI and then bring her back to discuss the results.    Lexi was seen today for post-op.    Diagnoses and all orders for this visit:    Herniated intervertebral disc of lumbar spine    Sciatica of right side    Imbalance  -     MRI Cervical Spine Without Contrast; Future    Hyperreflexia  -     MRI Cervical Spine Without Contrast; Future      Return for After radiographic tests.

## 2019-12-13 ENCOUNTER — OFFICE VISIT (OUTPATIENT)
Dept: NEUROSURGERY | Facility: CLINIC | Age: 39
End: 2019-12-13

## 2019-12-13 VITALS
BODY MASS INDEX: 26.68 KG/M2 | HEIGHT: 62 IN | SYSTOLIC BLOOD PRESSURE: 136 MMHG | WEIGHT: 145 LBS | DIASTOLIC BLOOD PRESSURE: 90 MMHG | HEART RATE: 85 BPM

## 2019-12-13 DIAGNOSIS — R29.2 HYPERREFLEXIA: ICD-10-CM

## 2019-12-13 DIAGNOSIS — M51.26 HERNIATED INTERVERTEBRAL DISC OF LUMBAR SPINE: Primary | ICD-10-CM

## 2019-12-13 DIAGNOSIS — R26.89 IMBALANCE: ICD-10-CM

## 2019-12-13 DIAGNOSIS — M54.31 SCIATICA OF RIGHT SIDE: ICD-10-CM

## 2019-12-13 PROCEDURE — 99024 POSTOP FOLLOW-UP VISIT: CPT | Performed by: NURSE PRACTITIONER

## 2019-12-20 ENCOUNTER — HOSPITAL ENCOUNTER (OUTPATIENT)
Dept: MRI IMAGING | Facility: HOSPITAL | Age: 39
Discharge: HOME | End: 2019-12-20
Admitting: NURSE PRACTITIONER

## 2019-12-20 DIAGNOSIS — R26.89 IMBALANCE: ICD-10-CM

## 2019-12-20 DIAGNOSIS — R29.2 HYPERREFLEXIA: ICD-10-CM

## 2019-12-20 PROCEDURE — 72141 MRI NECK SPINE W/O DYE: CPT

## 2019-12-30 ENCOUNTER — TELEPHONE (OUTPATIENT)
Dept: NEUROSURGERY | Facility: CLINIC | Age: 39
End: 2019-12-30

## 2020-01-02 NOTE — TELEPHONE ENCOUNTER
Louann Melgar R, APRN  You 5 hours ago (1:01 PM)      Please let her know that she has arthritis in her cervical spine as she has been told before.  She does not have a bruise in her spinal cord which was my concern based on her reflexes.  This is something that may need to be followed intermittently if she develops any arm symptoms or worsening balance or coordination issues.    Routing comment      Patient was given the results from Louann above. She requested we cancel her appointment and she will call us if she has any of those symptoms.

## 2022-05-02 ENCOUNTER — OFFICE (OUTPATIENT)
Dept: URBAN - METROPOLITAN AREA CLINIC 64 | Facility: CLINIC | Age: 42
End: 2022-05-02

## 2022-05-02 VITALS
HEIGHT: 61 IN | DIASTOLIC BLOOD PRESSURE: 112 MMHG | WEIGHT: 154 LBS | SYSTOLIC BLOOD PRESSURE: 157 MMHG | HEART RATE: 83 BPM

## 2022-05-02 DIAGNOSIS — R14.0 ABDOMINAL DISTENSION (GASEOUS): ICD-10-CM

## 2022-05-02 DIAGNOSIS — R10.13 EPIGASTRIC PAIN: ICD-10-CM

## 2022-05-02 PROCEDURE — 99204 OFFICE O/P NEW MOD 45 MIN: CPT | Performed by: NURSE PRACTITIONER

## 2022-05-02 RX ORDER — ESOMEPRAZOLE MAGNESIUM 40 MG/1
40 CAPSULE, DELAYED RELEASE ORAL
Qty: 90 | Refills: 3 | Status: ACTIVE
Start: 2022-05-02

## 2022-06-14 ENCOUNTER — OFFICE (OUTPATIENT)
Dept: URBAN - METROPOLITAN AREA CLINIC 64 | Facility: CLINIC | Age: 42
End: 2022-06-14

## 2022-06-14 VITALS
HEART RATE: 76 BPM | HEIGHT: 61 IN | DIASTOLIC BLOOD PRESSURE: 115 MMHG | SYSTOLIC BLOOD PRESSURE: 170 MMHG | WEIGHT: 154 LBS

## 2022-06-14 DIAGNOSIS — R14.0 ABDOMINAL DISTENSION (GASEOUS): ICD-10-CM

## 2022-06-14 DIAGNOSIS — R10.13 EPIGASTRIC PAIN: ICD-10-CM

## 2022-06-14 PROCEDURE — 99214 OFFICE O/P EST MOD 30 MIN: CPT | Performed by: NURSE PRACTITIONER

## 2022-07-27 ENCOUNTER — ON CAMPUS - OUTPATIENT (OUTPATIENT)
Dept: RURAL HOSPITAL 3 | Facility: HOSPITAL | Age: 42
End: 2022-07-27
Payer: COMMERCIAL

## 2022-07-27 DIAGNOSIS — R19.4 CHANGE IN BOWEL HABIT: ICD-10-CM

## 2022-07-27 DIAGNOSIS — R10.13 EPIGASTRIC PAIN: ICD-10-CM

## 2022-07-27 DIAGNOSIS — D12.5 BENIGN NEOPLASM OF SIGMOID COLON: ICD-10-CM

## 2022-07-27 DIAGNOSIS — K29.50 UNSPECIFIED CHRONIC GASTRITIS WITHOUT BLEEDING: ICD-10-CM

## 2022-07-27 DIAGNOSIS — K20.80 OTHER ESOPHAGITIS WITHOUT BLEEDING: ICD-10-CM

## 2022-07-27 DIAGNOSIS — R13.10 DYSPHAGIA, UNSPECIFIED: ICD-10-CM

## 2022-07-27 DIAGNOSIS — K64.0 FIRST DEGREE HEMORRHOIDS: ICD-10-CM

## 2022-07-27 PROCEDURE — 43450 DILATE ESOPHAGUS 1/MULT PASS: CPT | Performed by: INTERNAL MEDICINE

## 2022-07-27 PROCEDURE — 43239 EGD BIOPSY SINGLE/MULTIPLE: CPT | Performed by: INTERNAL MEDICINE

## 2022-07-27 PROCEDURE — 45380 COLONOSCOPY AND BIOPSY: CPT | Performed by: INTERNAL MEDICINE

## 2022-08-08 ENCOUNTER — TELEPHONE (OUTPATIENT)
Dept: NEUROSURGERY | Facility: CLINIC | Age: 42
End: 2022-08-08

## 2022-08-08 NOTE — TELEPHONE ENCOUNTER
Provider: LANCE  Caller: PAUL  Relationship to Patient: SELF    Phone Number: [phone]  Reason for Call: WENT TO THE ED AND FOUND OUT SHE HAS A FRACTURE IN HER BACK   When was the patient last seen: 11/5/2019  SURGERY: 09/30/2019-Lumbar Microscopic Discectomy, right Lumbar 5 Sacral 1  When did it start: 08/06/2022  Where is it located: LOWER BACK       PT WENT TO THE ED TODAY DUE TO HER SLIPPING AND FALLING, THEY DID A CT SCAN THAT SHOWS A nondisplaced L1-L3 transverse process fractures. NOTE IN CARE EVERYWHERE    SHE WOULD LIKE TO SEE DR LUCAS AGAIN . PLEASE CALL PT AND ADVISE OF NEW APPT     THANK YOU

## 2022-08-09 ENCOUNTER — OFFICE VISIT (OUTPATIENT)
Dept: NEUROSURGERY | Facility: CLINIC | Age: 42
End: 2022-08-09

## 2022-08-09 VITALS
BODY MASS INDEX: 27.6 KG/M2 | WEIGHT: 150 LBS | SYSTOLIC BLOOD PRESSURE: 132 MMHG | HEART RATE: 89 BPM | OXYGEN SATURATION: 99 % | DIASTOLIC BLOOD PRESSURE: 88 MMHG | HEIGHT: 62 IN

## 2022-08-09 DIAGNOSIS — S32.009D CLOSED FRACTURE OF TRANSVERSE PROCESS OF LUMBAR VERTEBRA WITH ROUTINE HEALING: Primary | ICD-10-CM

## 2022-08-09 PROCEDURE — 99212 OFFICE O/P EST SF 10 MIN: CPT | Performed by: NURSE PRACTITIONER

## 2022-08-09 RX ORDER — VENLAFAXINE HYDROCHLORIDE 75 MG/1
CAPSULE, EXTENDED RELEASE ORAL DAILY
COMMUNITY
Start: 2022-04-08

## 2022-08-09 RX ORDER — CYCLOBENZAPRINE HCL 10 MG
10 TABLET ORAL
COMMUNITY
Start: 2022-08-08 | End: 2022-08-18

## 2022-08-09 NOTE — PROGRESS NOTES
"Subjective   Patient ID: Lexi Carr is a 41 y.o. female is here today for follow-up of lower back pain. She presented to the ED yesterday after a fall and CT shows nondisplaced L1-L3 transverse process fractures. She has a history of Lumbar Microscopic Discectomy, right Lumbar 5 Sacral 1 with Dr. Mims on 09/30/2019. She was last seen in the office 12/13/2019.    She is here today with her .    Today Ms. Carr reports she is having constant low back pain that started on Saturday after falling approximately 3-4 feet off a boat, landing in her right side. She reports of pain in her left buttock. She denies any leg pain. She denies any numbness or tingling. She denies any leg weakness. She denies any bowel or bladder incontinence. She reports the pain increases with sitting, bending or lying down. She reports she is taking flexeril 10mg and ibuprofen for pain.       History of Present Illness    The following portions of the patient's history were reviewed and updated as appropriate: allergies, current medications, past medical history, past social history, past surgical history and problem list.    Review of Systems   Constitutional: Positive for activity change.   Musculoskeletal: Positive for back pain.   Neurological: Negative for weakness and numbness.   Psychiatric/Behavioral: Positive for sleep disturbance.         Objective     Vitals:    08/09/22 0904   BP: 132/88   Pulse: 89   SpO2: 99%   Weight: 68 kg (150 lb)   Height: 157.5 cm (62\")     Body mass index is 27.44 kg/m².      Physical Exam  Vitals reviewed.   HENT:      Head: Normocephalic and atraumatic.      Nose: Nose normal.      Mouth/Throat:      Mouth: Mucous membranes are moist.   Eyes:      Extraocular Movements: Extraocular movements intact.   Pulmonary:      Effort: Pulmonary effort is normal.   Abdominal:      Palpations: Abdomen is soft.   Musculoskeletal:      Cervical back: Normal range of motion and neck supple.   Skin:     " General: Skin is warm and dry.   Neurological:      General: No focal deficit present.      Mental Status: She is alert and oriented to person, place, and time. Mental status is at baseline.      Deep Tendon Reflexes: Strength normal.   Psychiatric:         Speech: Speech normal.       Neurologic Exam     Mental Status   Oriented to person, place, and time.   Attention: normal. Concentration: normal.   Speech: speech is normal   Level of consciousness: alert  Knowledge: good.   Normal comprehension.     Cranial Nerves   Cranial nerves II through XII intact.     Motor Exam   Muscle bulk: normal  Overall muscle tone: normal    Strength   Strength 5/5 throughout.     Sensory Exam   Light touch normal.     Gait, Coordination, and Reflexes   Gait is antalgic 2/2 back pain           Assessment & Plan   Independent Review of Radiographic Studies:      I personally reviewed the images from the following studies:  CT CHEST/ABDOMEN/PELVIS 8/9/22-  Nondisplaced right L1 through L3 transverse process fractures.  No other evidence of significant trauma is identified.       Medical Decision Making:      Ms. Carr is here today for acute right-sided back pain after falling approximately 3-4 feet off a boat, landing in her right side. She went to the ED where CT chest, abdomen, and pelvis were done which showed nondisplaced L1-L3 transverse process fractures. Her neuro exam is negative other than ongoing right sided back pain that is increased with deep breathing, sitting, and bending. Advised patient that there is no surgical intervention warranted for these nondisplaced fractures and to continue alternating ibuprofen and Tylenol along with muscle relaxers. She asked about wearing a back brace but advised her that the brace would prohibit her from taking deep breaths which could potentially lead to the development of PNA. Advised her to alternate the use of heat and ice as well to help with pain and avoid any high impact  activities. Advised to call with new or worsening symptoms. She verbalized understanding and is agreement with the above plan.        Diagnoses and all orders for this visit:    1. Closed fracture of transverse process of lumbar vertebra with routine healing (Primary)      Return if symptoms worsen or fail to improve.

## 2022-08-23 ENCOUNTER — OFFICE (OUTPATIENT)
Dept: URBAN - METROPOLITAN AREA CLINIC 64 | Facility: CLINIC | Age: 42
End: 2022-08-23

## 2022-08-23 VITALS
HEART RATE: 93 BPM | WEIGHT: 150 LBS | DIASTOLIC BLOOD PRESSURE: 99 MMHG | HEIGHT: 61 IN | SYSTOLIC BLOOD PRESSURE: 136 MMHG

## 2022-08-23 DIAGNOSIS — K59.00 CONSTIPATION, UNSPECIFIED: ICD-10-CM

## 2022-08-23 DIAGNOSIS — R14.0 ABDOMINAL DISTENSION (GASEOUS): ICD-10-CM

## 2022-08-23 PROCEDURE — 99213 OFFICE O/P EST LOW 20 MIN: CPT

## 2023-05-21 NOTE — ANESTHESIA PREPROCEDURE EVALUATION
Anesthesia Evaluation     Patient summary reviewed and Nursing notes reviewed   history of anesthetic complications: PONV  NPO Solid Status: > 8 hours  NPO Liquid Status: > 2 hours           Airway   Mallampati: II  TM distance: >3 FB  Neck ROM: full  Dental - normal exam     Pulmonary - normal exam    breath sounds clear to auscultation  (+) a smoker (15 pack years) Current Smoked day of surgery,   Cardiovascular - negative cardio ROS and normal exam    Rhythm: regular  Rate: normal    (-) angina, orthopnea, PND, CHENG      Neuro/Psych  (+) numbness, psychiatric history Anxiety,     GI/Hepatic/Renal/Endo - negative ROS     Musculoskeletal     (+) back pain, chronic pain,   Abdominal    Substance History - negative use     OB/GYN negative ob/gyn ROS         Other - negative ROS                     Anesthesia Plan    ASA 2     general     intravenous induction   Anesthetic plan, all risks, benefits, and alternatives have been provided, discussed and informed consent has been obtained with: patient.      
- - -

## 2024-10-31 ENCOUNTER — TRANSCRIBE ORDERS (OUTPATIENT)
Dept: ADMINISTRATIVE | Facility: HOSPITAL | Age: 44
End: 2024-10-31

## 2024-10-31 DIAGNOSIS — Z13.6 ENCOUNTER FOR SCREENING FOR VASCULAR DISEASE: Primary | ICD-10-CM

## 2024-11-07 ENCOUNTER — HOSPITAL ENCOUNTER (OUTPATIENT)
Dept: CARDIOLOGY | Facility: HOSPITAL | Age: 44
Discharge: HOME | End: 2024-11-07
Admitting: NURSE PRACTITIONER

## 2024-11-07 DIAGNOSIS — Z13.6 ENCOUNTER FOR SCREENING FOR VASCULAR DISEASE: ICD-10-CM

## 2024-11-07 LAB
BH CV VAS SCREENING CAROTID CCA LEFT: 79.9 CM/SEC
BH CV VAS SCREENING CAROTID CCA RIGHT: 80.1 CM/SEC
BH CV VAS SCREENING CAROTID ICA LEFT: 67.2 CM/SEC
BH CV VAS SCREENING CAROTID ICA RIGHT: 61.5 CM/SEC
BH CV XLRA MEAS - PAD LEFT ABI PT: 1.23
BH CV XLRA MEAS - PAD LEFT ARM: 114 MMHG
BH CV XLRA MEAS - PAD LEFT LEG PT: 140 MMHG
BH CV XLRA MEAS - PAD RIGHT ABI PT: 1.25
BH CV XLRA MEAS - PAD RIGHT ARM: 110 MMHG
BH CV XLRA MEAS - PAD RIGHT LEG PT: 143 MMHG
BH CV XLRA MEAS - PROX AO DIAM: 2.03 CM
BH CV XLRA MEAS LEFT DIST CCA EDV: -33.4 CM/SEC
BH CV XLRA MEAS LEFT DIST CCA PSV: -79.9 CM/SEC
BH CV XLRA MEAS LEFT ICA/CCA RATIO: 0.8
BH CV XLRA MEAS LEFT PROX ICA EDV: -25 CM/SEC
BH CV XLRA MEAS LEFT PROX ICA PSV: -67.2 CM/SEC
BH CV XLRA MEAS RIGHT DIST CCA EDV: -29.2 CM/SEC
BH CV XLRA MEAS RIGHT DIST CCA PSV: -80.1 CM/SEC
BH CV XLRA MEAS RIGHT ICA/CCA RATIO: 0.8
BH CV XLRA MEAS RIGHT PROX ICA EDV: -23.7 CM/SEC
BH CV XLRA MEAS RIGHT PROX ICA PSV: -61.5 CM/SEC

## 2024-11-07 PROCEDURE — 93799 UNLISTED CV SVC/PROCEDURE: CPT

## 2025-03-31 NOTE — PROGRESS NOTES
New Patient Consultation    REFERRING PHYSICIAN:  Xi Hunter MD  [facility] [address]    PRIMARY CARE PHYSICIAN:  Carmen Rodriges APRN      HISTORY OF PRESENT ILLNESS    This is a 44 y.o. female who presents with a 2-year history of a palpable area in the left breast with negative imaging of the area in 2023, 2024, and 2025.  She has a history of a bilateral breast reduction followed by a subsequent bilateral breast lift and augmentation with implants (subpectoral, gel).    She first noticed a left breast mass about about 2 years ago.  In 2023 she had pain and felt a mass in the outer aspect of her left breast but had a normal mammogram.  She again had a mammogram in 2024 which was reported as normal.  She was still able to feel a mass that had increased in size and was painful.  She presented to her plastic surgeon Dr. Baljit Torres and the mass was palpated.  An attempt was made to drain her biopsy of the mass then but no tissue was recovered.  An MRI was recommended and ordered on 3/12/2025 but denied by insurance.    She then underwent a bilateral diagnostic mammogram and left breast ultrasound on 3/28/2025 which noted bilateral breast implants.  No suspicious mass, architectural distortion, or suspicious calcification was identified in either breast.  In the left breast on ultrasound no suspicious mass was noted in the region of interest in the left breast at 2:00 8 cm from the nipple.  Dense breast tissue was noted and no cyst was seen.  There were no discrete focal abnormalities.  The imaging was read as BI-RADS 1.    Today, she is doing well and can persistently feel this dense area in her upper outer quadrant of the left breast.  She has occasional throbbing pain there that sometimes radiates towards the axilla.  Denies any overlying skin changes or any other breast masses or densities.  She is considering having her breast implants removed due to concern for breast  implant related disease.    REPRODUCTIVE HISTORY:   . P: 1. AB: 0.  Age at menarche: 15  Age at first childbirth: 17  Lactation/How long: no  Age at menopause: pre-menopausal  Total years of oral contraceptive use: no, hx of depo shot for several years  Total years of hormone replacement therapy: 6-month history of implanted estrogen and testosterone pellets in the gluteal tissue in     PAST MEDICAL HISTORY:  Past Medical History:   Diagnosis Date    Anxiety     Back pain     Cholesterol blood reduced     Hypertension     Neuropathy     RIGHT LEG    PONV (postoperative nausea and vomiting)        PAST SURGICAL HISTORY:  Past Surgical History:   Procedure Laterality Date    BREAST AUGMENTATION Bilateral 2020    Brian Martinez    CHOLECYSTECTOMY      LUMBAR DISCECTOMY Right 2019    Procedure: Lumbar Microscopic Discectomy, right Lumbar 5 Sacral 1;  Surgeon: Omari Mims MD;  Location: [facility];  Service: Neurosurgery    REDUCTION MAMMAPLASTY  2020    TONSILLECTOMY         Family History:  Family History   Problem Relation Age of Onset    Cancer Maternal Grandmother         unknown type    Stomach cancer Paternal Grandmother     Malig Hyperthermia Neg Hx        She has a family history significant for a cancer in her maternal grandmother that was at her creatin carotid artery and stomach cancer in her paternal grandmother.  Denies any family history of breast cancer, colon cancer, ovarian cancer, prostate cancer, pancreatic cancer, melanoma.    She is not of Ashkenazi Jew descent.  She has never been genetically tested.    SOCIAL HISTORY:  Social History     Tobacco Use    Smoking status: Every Day     Current packs/day: 0.75     Average packs/day: 0.8 packs/day for 21.3 years (16.0 ttl pk-yrs)     Types: Cigarettes     Passive exposure: Current    Smokeless tobacco: Never    Tobacco comments:     daily caffeine use   Vaping Use    Vaping status: Never Used   Substance  "Use Topics    Alcohol use: Yes     Comment: social    Drug use: No       Patient works as an  at [employer].  She is  and lives her  [place].  Patient denies any significant alcohol use or any drug use.  She does smoke cigarettes.    Medications:   Outpatient Encounter Medications as of 4/2/2025   Medication Sig Dispense Refill    losartan (COZAAR) 25 MG tablet Take 1 tablet by mouth Daily.      [DISCONTINUED] PROGESTERONE MICRONIZED PO Take 100 mg by mouth Daily.      [DISCONTINUED] venlafaxine XR (EFFEXOR-XR) 75 MG 24 hr capsule Take  by mouth Daily.       No facility-administered encounter medications on file as of 4/2/2025.        Allergies:   Allergies   Allergen Reactions    Bactrim [Sulfamethoxazole-Trimethoprim] Hives and Itching    Morphine Hives, Itching and Rash        Review of systems: A 14 point review of systems was obtained and was negative    PHYSICAL EXAM     /88 (BP Location: Right arm, Patient Position: Sitting, Cuff Size: Adult)   Pulse 91   Temp 99.1 °F (37.3 °C) (Infrared)   Resp 16   Ht 157.5 cm (62\")   Wt 67.4 kg (148 lb 8 oz)   SpO2 98%   BMI 27.16 kg/m²     General appearance:alert, appears stated age, and cooperative  Cardiac: normal rate and regular rhythm, well perfused. No significant peripheral edema.  Respiratory: clear to auscultation bilaterally, equal bilateral chest rise with normal effort on room air  Breast Exam:  Bilateral breast exam performed seated and supine.  Bilateral breasts are symmetric and not ptotic.  Bilaterally there are no overlying skin changes, nipple changes, nipple inversion, or nipple discharge.  In the left breast in the upper outer quadrant there is a denser area of deep tissue just above the pectoralis muscle/implant that is palpable and mildly tender to deep palpation.  In the same location on the right breast there is a similar but less prominent ridge of denser tissue.  Scanning with the " ultrasound shows skin with more fatty replaced tissue superficially and then an area of more heterogeneous appearing dense glandular breast tissue over the pectoralis muscle and implant without any discrete lesion or mass.  Bilaterally there is no adenopathy in the axillary, supraclavicular, or cervical lymph node basins.     Patient exam or treatment required medical chaperone.  The sensitive parts of the examination were performed with chaperone present: Allyssa Puckett MA    IMAGING:  I personally reviewed the patient's mammograms from 2023, 2024, 2025 as well as her left breast ultrasound from 2025.  My impression is bilateral subpectoral gel implants without signs of complication and most of the dense glandular breast tissue in the left breast pushed towards the upper outer quadrant without a discrete mass, architectural distortion, calcification, or other abnormality.  This is redemonstrated on ultrasound as a deep layer of denser appearing breast tissue.  I personally reviewed the radiology imaging and the radiology department earlier this week with no other new findings.    MAMMOGRAM DIAGNOSTIC BILATERAL WITH TOMOGRAPHY, US BREAST LEFT    Date of Exam: 3/28/2025 14:49 EDT    Indication: LT Breast Lump/Pain. History of prior bilateral breast reduction. Subsequent bilateral breast lift and implant placement.    Comparison: 4/26/2024, 8/15/2023, 11/8/2022, 11/1/2021    Technique: Diagnostic images of bilateral breasts were obtained with the use of tomography. Additional diagnostic left breast ultrasound with grayscale and color imaging was performed. The mammographic images were interpreted with the assistance of a computer aided detection system.    Findings:  Breast Density: The breasts are heterogenously dense, which may obscure small masses.    Findings:  Mammogram:  Bilateral breast implants are noted. No new suspicious mass, architectural distortion, or suspicious calcification is identified in either  breast.    Ultrasound:  Targeted ultrasound of the left breast was performed.  No suspicious mass is noted within the region of interest within the left breast at the 2 o'clock position approximately 8 cm from the nipple. Dense breast tissue is noted. No cyst is observed. No discrete focal abnormalities are identified.    IMPRESSION:  Negative and stable bilateral mammogram. Negative diagnostic ultrasound of the left breast. Clinical follow-up and routine screening mammography are recommended.    BI-RADS ASSESSMENT: BI-RADS 1. Negative.      Note: It has been reported that there is approximately a 15% false negative in mammography. Therefore, management of a palpable abnormality should not be deferred because of a negative mammogram.    Electronically Signed: Vidal Kingston MD  3/28/2025 16:05 EDT  Workstation ID: [id number]        Assessment:  This is a 44 y.o. female with a persistent palpable density in the upper outer quadrant of the left breast that is painful and slightly mobile.  She has had benign imaging with mammogram and ultrasound over the past few years but continues to have this palpable area.  There is concern for it in her plastic surgeons office and that a biopsy/aspiration attempt was made but was unsuccessful.    Given the stability on imaging over the past several years I think this is likely an area of dense breast tissue that has been rearranged with her breast lift and some associated scar tissue.  Given the persistent concern however I think further investigation is warranted to rule out other pathology.    We discussed breast pain and how it can sometimes be difficult to treat. We discussed that this is often related to hormonal changes. Caffeine and nicotine can also play a role in breast pain, and I encouraged her to moderate caffeine consumption and avoiding nicotine. We discussed additional therapies such as vitamin E supplementation and evening primrose oil and that this may or may  not be useful. We also discussed using OTC tylenol or ibuprofen and/or topical ice or heat for prn pain control. Finally, we discussed the use of topical NSAID creams such as Voltaren gel..       Plan:  - I will order a bilateral contrasted breast MRI to further investigate this palpable area in the left breast that does not have a clear discrete lesion visible on ultrasound or MRI given persistent concern in the area.  - Follow-up in clinic after the MRI to discuss the results  - We discussed her breast pain and I recommended vitamin E and evening primrose oil supplementation as well as over-the-counter NSAIDs, in particular topical NSAID such as Voltaren gel for the painful area on the left outer upper outer quadrant of the breast.    BMI is >= 25 and <30. (Overweight) The following options were offered after discussion;: nutrition counseling/recommendations         Signed:    Omari Reese MD  Breast Surgical Oncology  [facility]

## 2025-04-02 ENCOUNTER — OFFICE VISIT (OUTPATIENT)
Age: 45
End: 2025-04-02
Payer: COMMERCIAL

## 2025-04-02 VITALS
SYSTOLIC BLOOD PRESSURE: 130 MMHG | HEIGHT: 62 IN | BODY MASS INDEX: 27.33 KG/M2 | TEMPERATURE: 99.1 F | RESPIRATION RATE: 16 BRPM | OXYGEN SATURATION: 98 % | HEART RATE: 91 BPM | DIASTOLIC BLOOD PRESSURE: 88 MMHG | WEIGHT: 148.5 LBS

## 2025-04-02 DIAGNOSIS — N63.21 MASS OF UPPER OUTER QUADRANT OF LEFT BREAST: Primary | ICD-10-CM

## 2025-04-02 PROBLEM — E78.5 HLD (HYPERLIPIDEMIA): Status: ACTIVE | Noted: 2023-07-20

## 2025-04-02 PROBLEM — Z09 SURGERY FOLLOW-UP EXAMINATION: Status: RESOLVED | Noted: 2019-10-14 | Resolved: 2025-04-02

## 2025-04-02 PROBLEM — I10 HTN (HYPERTENSION): Status: ACTIVE | Noted: 2025-04-02

## 2025-04-02 PROBLEM — M51.26 HERNIATED LUMBAR INTERVERTEBRAL DISC: Status: ACTIVE | Noted: 2019-08-14

## 2025-04-02 PROBLEM — F17.200 NICOTINE DEPENDENCE: Status: ACTIVE | Noted: 2018-01-09

## 2025-04-02 RX ORDER — LOSARTAN POTASSIUM 25 MG/1
25 TABLET ORAL DAILY
COMMUNITY

## 2025-04-11 ENCOUNTER — PATIENT ROUNDING (BHMG ONLY) (OUTPATIENT)
Age: 45
End: 2025-04-11
Payer: COMMERCIAL

## 2025-04-11 NOTE — PROGRESS NOTES
April 11, 2025    Hello, may I speak with Lexi Carr?    My name is Lindsey       I am  with [facility]  [address] [place] [address] [phone].    Before we get started may I verify your date of birth? 1980    I am calling to officially welcome you to our practice and ask about your recent visit. Is this a good time to talk? yes    Tell me about your visit with us. What things went well?  patient stated that everything was good and was satisfied.       We're always looking for ways to make our patients' experiences even better. Do you have recommendations on ways we may improve?  no    Overall were you satisfied with your first visit to our practice? yes       I appreciate you taking the time to speak with me today. Is there anything else I can do for you? no      Thank you, and have a great day.

## 2025-04-30 ENCOUNTER — HOSPITAL ENCOUNTER (OUTPATIENT)
Dept: MRI IMAGING | Facility: HOSPITAL | Age: 45
Discharge: HOME | End: 2025-04-30
Admitting: SURGERY
Payer: COMMERCIAL

## 2025-04-30 DIAGNOSIS — N63.21 MASS OF UPPER OUTER QUADRANT OF LEFT BREAST: ICD-10-CM

## 2025-04-30 PROCEDURE — 77049 MRI BREAST C-+ W/CAD BI: CPT

## 2025-04-30 PROCEDURE — 25010000002 GADOTERIDOL PER 1 ML: Performed by: SURGERY

## 2025-04-30 PROCEDURE — A9579 GAD-BASE MR CONTRAST NOS,1ML: HCPCS | Performed by: SURGERY

## 2025-04-30 RX ADMIN — GADOTERIDOL 20 ML: 279.3 INJECTION, SOLUTION INTRAVENOUS at 15:24

## 2025-05-14 ENCOUNTER — OFFICE VISIT (OUTPATIENT)
Age: 45
End: 2025-05-14
Payer: COMMERCIAL

## 2025-05-14 VITALS
WEIGHT: 150.2 LBS | HEIGHT: 62 IN | BODY MASS INDEX: 27.64 KG/M2 | RESPIRATION RATE: 16 BRPM | OXYGEN SATURATION: 96 % | DIASTOLIC BLOOD PRESSURE: 69 MMHG | TEMPERATURE: 99.3 F | SYSTOLIC BLOOD PRESSURE: 103 MMHG | HEART RATE: 98 BPM

## 2025-05-14 DIAGNOSIS — N64.4 BREAST PAIN, LEFT: ICD-10-CM

## 2025-05-14 DIAGNOSIS — N63.21 MASS OF UPPER OUTER QUADRANT OF LEFT BREAST: Primary | ICD-10-CM

## 2025-05-14 NOTE — PROGRESS NOTES
Follow-up visit    PRIMARY CARE PHYSICIAN:  Carmen Rodriges APRN    PLASTIC SURGEON: Baljit Torres MD    HISTORY OF PRESENT ILLNESS    This is a 44 y.o. female who presents with a 2-year history of a palpable area in the left breast with negative imaging of the area in 2023, 2024, and 2025. She has a history of a bilateral breast reduction in December of 2020 followed by a subsequent bilateral breast lift and augmentation with implants (subpectoral, gel).     She was last seen in clinic on 4/2/2025.  There was an area of denser tissue palpable in the left lateral breast at the patient's area of concern in a similar area on the contralateral side that was less prominent.    Since then, she underwent a bilateral contrasted MRI with no MRI abnormality in the area of palpable concern and intact subpectoral implants.  On my review of the MRI, she does have slightly more glandular tissue at the area of concern compared to the contralateral side but no concerning enhancement.    Does still get some soreness of the left lateral breast but feels like some of it has been related to her frequent exams.  She has been trying to avoid excess contact in the area.    She is also seeing a new physician (Kaya Rivas MD) at the [facility] working up some endocrine concerns.      PAST MEDICAL HISTORY:  Past Medical History:   Diagnosis Date    Anxiety     Back pain     Cholesterol blood reduced 2020    Hypertension 2023    Neuropathy     RIGHT LEG    PONV (postoperative nausea and vomiting)        PAST SURGICAL HISTORY:  Past Surgical History:   Procedure Laterality Date    BREAST AUGMENTATION Bilateral 12/2020    Brian Martinez    CHOLECYSTECTOMY      LUMBAR DISCECTOMY Right 09/30/2019    Procedure: Lumbar Microscopic Discectomy, right Lumbar 5 Sacral 1;  Surgeon: Omari Mims MD;  Location: [facility];  Service: Neurosurgery    REDUCTION MAMMAPLASTY  12/2020    TONSILLECTOMY  2000  "        Family History:  Family History   Problem Relation Age of Onset    Cancer Maternal Grandmother         unknown type    Stomach cancer Paternal Grandmother     [place] Hyperthermia Neg Hx        She has a family history significant for a cancer in her maternal grandmother involving her carotid artery and stomach cancer in her paternal grandmother.  Denies any family history of breast cancer, colon cancer, ovarian cancer, prostate cancer, pancreatic cancer, melanoma.     She is not of Ashkenazi [place] descent.  She has never been genetically tested.    SOCIAL HISTORY:  Social History     Tobacco Use    Smoking status: Every Day     Current packs/day: 0.75     Average packs/day: 0.8 packs/day for 21.3 years (16.0 ttl pk-yrs)     Types: Cigarettes     Passive exposure: Current    Smokeless tobacco: Never    Tobacco comments:     daily caffeine use   Vaping Use    Vaping status: Never Used   Substance Use Topics    Alcohol use: Yes     Comment: social    Drug use: No       Patient works as an  at [employer].  She is  and lives her  [place].  Patient denies any significant alcohol use or any drug use.  She does smoke cigarettes.     Medications:   Outpatient Encounter Medications as of 5/14/2025   Medication Sig Dispense Refill    losartan (COZAAR) 25 MG tablet Take 1 tablet by mouth Daily.       No facility-administered encounter medications on file as of 5/14/2025.        Allergies:   Allergies   Allergen Reactions    Bactrim [Sulfamethoxazole-Trimethoprim] Hives and Itching    Morphine Hives, Itching and Rash        Review of systems: A 14 point review of systems was obtained and was negative    PHYSICAL EXAM     /69 (BP Location: Left arm, Patient Position: Sitting, Cuff Size: Adult)   Pulse 98   Temp 99.3 °F (37.4 °C) (Infrared)   Resp 16   Ht 157.5 cm (62\")   Wt 68.1 kg (150 lb 3.2 oz)   SpO2 96%   BMI 27.47 kg/m²     General appearance:alert, appears " stated age, and cooperative  Breast Exam:  Deferred today    IMAGING:  My interpretation of the patient's bilateral contrasted MRI is slightly more glandular tissue at the area of concern compared to the contralateral side but no concerning enhancement.    PROCEDURE:  MRI BREAST BILATERAL DIAGNOSTIC W WO CONTRAST-     DATE OF EXAM:  4/30/2025 2:36 PM     INDICATIONS:  Persistent palpable mass in the left breast. Mammographic and  sonographic work-up were negative.     COMPARISON:  Numerous outside mammograms dated March 28, 2025, April 26, 2024, August  15, 2023, November 8, 2022 and November 1, 2021. Left breast ultrasound  dated March 28, 2025.     TECHNIQUE:  Routine magnetic resonance imaging was obtained of both breasts before  and after the administration of intravenous contrast. Multi-sequence  axial imaging was done through the breasts, evaluated with and without  subtraction. Images were post processed and reviewed on the [employer] CAD  workstation.     FINDINGS:  Amount of fibroglandular tissue: Heterogeneous fibroglandular tissue.     Background parenchymal enhancement: Minimal Symmetric.     A marker denotes the area of palpable concern in the 3:00 middle third  left breast. There is no corresponding MRI abnormality. Subpectoral  implants are intact. There are no suspicious enhancing masses or areas  of suspicious non mass enhancement in either breast.     There is no axillary or internal mammary adenopathy.     There are no suspicious enhancing lesions elsewhere in the visualized  chest or abdomen.           IMPRESSION:  1.     No MR evidence of malignancy. Specifically no MR abnormality in  the area of palpable concern.  2.     Intact subpectoral implants.     Clinical management of the patient's complaint is recommended. In the  absence of clinical concerns, continued annual screening mammography is  recommended.        BIRADS ASSESSMENT:Category 2: Benign       4/30/2025 4:35 PM by Mahesh Tang on  Workstation: [id number]         Assessment:  This is a 44 y.o. female with a 2-year history of a palpable area in the left lateral breast as well as breast pain.  She has undergone mammographic and ultrasound imaging over the past several years to evaluate the area of concern in her left breast and recently underwent an MRI which showed no significant abnormalities.  I think there is a slight asymmetry in the glandular tissue which is displaced laterally in the left breast by her augmentation.  There are no concerning findings for an underlying malignancy or specific lesion.    Plan:  - I recommended that she return to annual screening mammography with next imaging due in March of next year.  - We discussed breast pain again and specifically using compressive bra during the day and evening as tolerated, oral and topical NSAIDs, and heat/ice packs to help with pain.  Breast pain can be hormonally driven and while she is premenopausal, if she is approaching menopause some fluctuations in estrogen could be a source of some of this pain.  - I encouraged her to call back in the clinic with any questions, concerns, changes in her breast exam, or other changes.  - I offered 3-month follow-up to readdress breast pain versus following up as needed and she preferred to follow-up as needed.             Signed:    Omari Reese MD  Breast Surgical Oncology  [facility]

## (undated) DEVICE — SMOKE EVACUATION TUBING WITH 7/8 IN TO 1/4 IN REDUCER: Brand: BUFFALO FILTER

## (undated) DEVICE — DRSNG WND BORDR/ADHS NONADHR/GZ LF 4X4IN STRL

## (undated) DEVICE — GLV SURG BIOGEL LTX PF 7 1/2

## (undated) DEVICE — QUINCKE POINT SPINAL NEEDLE, BLACK: Brand: RELI

## (undated) DEVICE — GLV SURG SENSICARE PI LF PF 7.5 GRN STRL

## (undated) DEVICE — GLV SURG TRIUMPH CLASSIC PF LTX 7.5 STRL

## (undated) DEVICE — SKIN PREP TRAY W/CHG: Brand: MEDLINE INDUSTRIES, INC.

## (undated) DEVICE — OIL CARTRIDGE: Brand: CORE, MAESTRO

## (undated) DEVICE — SUT VIC UD BR COAT OS/4 0 27IN SLVR J694H

## (undated) DEVICE — SYR CONTRL LUERLOK 10CC

## (undated) DEVICE — DRP MICROSCOPE 4 BINOCULAR CV 54X150IN

## (undated) DEVICE — GLV SURG BIOGEL LTX PF 8

## (undated) DEVICE — ANTIBACTERIAL UNDYED BRAIDED (POLYGLACTIN 910), SYNTHETIC ABSORBABLE SUTURE: Brand: COATED VICRYL

## (undated) DEVICE — SUT VIC 3/0 X1 27IN J458H

## (undated) DEVICE — PK NEURO SPINE 40

## (undated) DEVICE — GLV SURG TRIUMPH CLASSIC PF LTX 8.5 STRL

## (undated) DEVICE — SYR LUERLOK 50ML

## (undated) DEVICE — DIFFUSER: Brand: CORE, MAESTRO

## (undated) DEVICE — CONN TBG Y 5 IN 1 LF STRL

## (undated) DEVICE — EXTENSION SET, MALE LUER LOCK ADAPTER WITH RETRACTABLE COLLAR

## (undated) DEVICE — 3.0MM NEURO (MATCH HEAD) LESS AGGRESSIVE

## (undated) DEVICE — DRSNG WND GZ PAD BORDERED 4X8IN STRL

## (undated) DEVICE — SPNG GZ WOVN 4X4IN 12PLY 10/BX STRL

## (undated) DEVICE — 3M™ STERI-STRIP™ ANTIMICROBIAL SKIN CLOSURES 1/2 INCH X 4 INCHES 50/CARTON 4 CARTONS/CASE A1847: Brand: 3M™ STERI-STRIP™

## (undated) DEVICE — COTTON BALLS, DOUBLE STRUNG: Brand: DEROYAL

## (undated) DEVICE — GLV SURG BIOGEL M LTX PF 8

## (undated) DEVICE — NDL SPINE 20G 3 1/2 YEL STRL 1P/U

## (undated) DEVICE — DISPOSABLE BIPOLAR CABLE 12FT. (3.6M): Brand: KIRWAN

## (undated) DEVICE — 1 ML TUBERCULIN SYRINGE REGULAR TIP: Brand: MONOJECT

## (undated) DEVICE — APPL CHLORAPREP W/TINT 26ML ORNG